# Patient Record
Sex: FEMALE | Race: WHITE | NOT HISPANIC OR LATINO | URBAN - METROPOLITAN AREA
[De-identification: names, ages, dates, MRNs, and addresses within clinical notes are randomized per-mention and may not be internally consistent; named-entity substitution may affect disease eponyms.]

---

## 2023-05-10 ENCOUNTER — OFFICE VISIT (OUTPATIENT)
Dept: PRIMARY CARE | Facility: CLINIC | Age: 68
End: 2023-05-10
Payer: MEDICARE

## 2023-05-10 VITALS — TEMPERATURE: 98 F

## 2023-05-10 DIAGNOSIS — J01.10 ACUTE NON-RECURRENT FRONTAL SINUSITIS: Primary | ICD-10-CM

## 2023-05-10 PROCEDURE — 1160F RVW MEDS BY RX/DR IN RCRD: CPT | Performed by: NURSE PRACTITIONER

## 2023-05-10 PROCEDURE — 1159F MED LIST DOCD IN RCRD: CPT | Performed by: NURSE PRACTITIONER

## 2023-05-10 PROCEDURE — 1036F TOBACCO NON-USER: CPT | Performed by: NURSE PRACTITIONER

## 2023-05-10 PROCEDURE — 99213 OFFICE O/P EST LOW 20 MIN: CPT | Performed by: NURSE PRACTITIONER

## 2023-05-10 RX ORDER — AMOXICILLIN 875 MG/1
875 TABLET, FILM COATED ORAL 2 TIMES DAILY
Qty: 20 TABLET | Refills: 0 | Status: SHIPPED | OUTPATIENT
Start: 2023-05-10 | End: 2023-05-20

## 2023-05-10 RX ORDER — LEVOTHYROXINE SODIUM 100 UG/1
100 TABLET ORAL
COMMUNITY
Start: 2022-11-28

## 2023-05-10 RX ORDER — FAMOTIDINE 10 MG/1
TABLET ORAL
COMMUNITY

## 2023-05-10 RX ORDER — CETIRIZINE HYDROCHLORIDE 10 MG/1
10 TABLET ORAL DAILY
COMMUNITY

## 2023-05-10 RX ORDER — PANTOPRAZOLE SODIUM 40 MG/1
40 TABLET, DELAYED RELEASE ORAL
COMMUNITY
Start: 2022-09-06

## 2023-05-10 RX ORDER — ALPRAZOLAM 0.25 MG/1
0.25 TABLET ORAL NIGHTLY PRN
COMMUNITY
Start: 2022-11-18

## 2023-05-10 RX ORDER — LOSARTAN POTASSIUM 50 MG/1
50 TABLET ORAL 2 TIMES DAILY
COMMUNITY
Start: 2022-10-21

## 2023-05-10 RX ORDER — BUSPIRONE HYDROCHLORIDE 10 MG/1
10 TABLET ORAL 2 TIMES DAILY
COMMUNITY
Start: 2023-04-19

## 2023-05-10 ASSESSMENT — ENCOUNTER SYMPTOMS
SORE THROAT: 1
SLEEP DISTURBANCE: 1
APPETITE CHANGE: 0
VOMITING: 0
RHINORRHEA: 1
DIARRHEA: 0
NAUSEA: 0
CONSTIPATION: 0
HEADACHES: 1
FATIGUE: 0
COUGH: 1
ACTIVITY CHANGE: 0
FEVER: 0
SINUS PRESSURE: 1

## 2023-05-10 NOTE — PROGRESS NOTES
"Subjective   Patient ID: Vilma Riggs is a 67 y.o. female who presents for Sinusitis.    Cold symptoms x1 month  Comes and goes; not going away   Feels like she has a sinus infection  Going to [place] in 2 weeks  Was in [place] with Grandkids    Full ears  Raw throat  Mild cough  Sneezing  Nasal congestion  Post nasal drip  Facial pressure  \"Never gets a fever\"    Home covid tests is negative    OTC- zyrtec/ nyquil/ decongestant      Sinusitis  This is a new problem. The current episode started 1 to 4 weeks ago. The problem has been waxing and waning since onset. There has been no fever. Associated symptoms include congestion, coughing, ear pain, headaches, sinus pressure, sneezing and a sore throat. (Ear pressure, fatigue, post nasal drainage) Past treatments include oral decongestants. The treatment provided mild relief.        Review of Systems   Constitutional:  Negative for activity change, appetite change, fatigue and fever.   HENT:  Positive for congestion, ear pain, postnasal drip, rhinorrhea, sinus pressure, sneezing and sore throat.    Respiratory:  Positive for cough.    Gastrointestinal:  Negative for constipation, diarrhea, nausea and vomiting.   Neurological:  Positive for headaches.   Psychiatric/Behavioral:  Positive for sleep disturbance.        Objective   There were no vitals taken for this visit.    Physical Exam  Vitals reviewed.   Constitutional:       Appearance: Normal appearance.   HENT:      Head: Normocephalic.      Right Ear: Tympanic membrane, ear canal and external ear normal.      Left Ear: Tympanic membrane, ear canal and external ear normal.      Nose: Mucosal edema, congestion and rhinorrhea present. Rhinorrhea is clear.      Mouth/Throat:      Lips: Pink.      Mouth: Mucous membranes are dry.      Pharynx: Posterior oropharyngeal erythema present. No pharyngeal swelling.   Eyes:      Extraocular Movements: Extraocular movements intact.      Pupils: Pupils are equal, round, " and reactive to light.   Cardiovascular:      Rate and Rhythm: Normal rate and regular rhythm.   Pulmonary:      Effort: Pulmonary effort is normal.      Breath sounds: Normal breath sounds.   Musculoskeletal:      Cervical back: Normal range of motion and neck supple.   Skin:     General: Skin is warm and dry.      Capillary Refill: Capillary refill takes less than 2 seconds.   Neurological:      General: No focal deficit present.      Mental Status: She is alert and oriented to person, place, and time.   Psychiatric:         Mood and Affect: Mood normal.         Behavior: Behavior normal.         Assessment/Plan   Problem List Items Addressed This Visit          Infectious/Inflammatory    Acute non-recurrent frontal sinusitis - Primary     Antibiotics given for acute sinusitis; Take full course until completed  Reviewed supportive care for cold symptoms  Can use Flonase and Zyrtec daily for symptom support  F/U with PCP if not improving over the next 3-5 days  ER for SOB, difficulty breathing, uncontrolled fever         Relevant Medications    amoxicillin (Amoxil) 875 mg tablet

## 2023-05-12 PROBLEM — J01.10 ACUTE NON-RECURRENT FRONTAL SINUSITIS: Status: ACTIVE | Noted: 2023-05-12

## 2023-05-12 NOTE — ASSESSMENT & PLAN NOTE
Antibiotics given for acute sinusitis; Take full course until completed  Reviewed supportive care for cold symptoms  Can use Flonase and Zyrtec daily for symptom support  F/U with PCP if not improving over the next 3-5 days  ER for SOB, difficulty breathing, uncontrolled fever

## 2024-12-09 ENCOUNTER — OFFICE VISIT (OUTPATIENT)
Dept: URGENT CARE | Age: 69
End: 2024-12-09
Payer: MEDICARE

## 2024-12-09 VITALS
SYSTOLIC BLOOD PRESSURE: 133 MMHG | OXYGEN SATURATION: 98 % | TEMPERATURE: 97.8 F | HEART RATE: 70 BPM | DIASTOLIC BLOOD PRESSURE: 74 MMHG | RESPIRATION RATE: 16 BRPM

## 2024-12-09 DIAGNOSIS — N30.01 ACUTE CYSTITIS WITH HEMATURIA: Primary | ICD-10-CM

## 2024-12-09 LAB
POC APPEARANCE, URINE: ABNORMAL
POC BILIRUBIN, URINE: NEGATIVE
POC BLOOD, URINE: ABNORMAL
POC COLOR, URINE: ABNORMAL
POC GLUCOSE, URINE: NEGATIVE MG/DL
POC KETONES, URINE: NEGATIVE MG/DL
POC LEUKOCYTES, URINE: ABNORMAL
POC NITRITE,URINE: NEGATIVE
POC PH, URINE: 6 PH
POC PROTEIN, URINE: NEGATIVE MG/DL
POC SPECIFIC GRAVITY, URINE: 1.01
POC UROBILINOGEN, URINE: 0.2 EU/DL

## 2024-12-09 PROCEDURE — 1159F MED LIST DOCD IN RCRD: CPT

## 2024-12-09 PROCEDURE — 87086 URINE CULTURE/COLONY COUNT: CPT

## 2024-12-09 PROCEDURE — 1036F TOBACCO NON-USER: CPT

## 2024-12-09 PROCEDURE — 81003 URINALYSIS AUTO W/O SCOPE: CPT

## 2024-12-09 PROCEDURE — 99204 OFFICE O/P NEW MOD 45 MIN: CPT

## 2024-12-09 PROCEDURE — 1160F RVW MEDS BY RX/DR IN RCRD: CPT

## 2024-12-09 RX ORDER — SULFAMETHOXAZOLE AND TRIMETHOPRIM 800; 160 MG/1; MG/1
1 TABLET ORAL 2 TIMES DAILY
Qty: 10 TABLET | Refills: 0 | Status: SHIPPED | OUTPATIENT
Start: 2024-12-09 | End: 2024-12-14

## 2024-12-09 ASSESSMENT — PATIENT HEALTH QUESTIONNAIRE - PHQ9
2. FEELING DOWN, DEPRESSED OR HOPELESS: NOT AT ALL
1. LITTLE INTEREST OR PLEASURE IN DOING THINGS: NOT AT ALL
SUM OF ALL RESPONSES TO PHQ9 QUESTIONS 1 AND 2: 0

## 2024-12-09 ASSESSMENT — ENCOUNTER SYMPTOMS
CHILLS: 0
FREQUENCY: 1
NAUSEA: 0
DIFFICULTY URINATING: 0
DIAPHORESIS: 0
HEMATURIA: 0
FATIGUE: 0
FLANK PAIN: 0
VOMITING: 0
DYSURIA: 1
ABDOMINAL PAIN: 0
FEVER: 0
BACK PAIN: 1

## 2024-12-09 NOTE — PROGRESS NOTES
Subjective   Patient ID: Vilma Riggs is a 69 y.o. female. They present today with a chief complaint of Back Pain, Chills, and Difficulty Urinating (x1wk).    History of Present Illness  Subjective  Vilma Riggs is a 69 y.o. female who complains of burning with urination, frequency, suprapubic pressure, and urgency for several days. Patient does not have a history of recurrent UTI or pyelonephritis.    @[place]@    [facility]  /74   Pulse 70   Temp 36.6 °C (97.8 °F) (Temporal)   Resp 16   SpO2 98%    General: alert and oriented, in no acute distress  Abdomen: soft, non-tender, without masses or organomegaly  Back: CVA tenderness absent    Laboratory:   Urine dipstick shows +leukocytes, +blood.      Assessment/Plan  Diagnoses and associated orders for this visit:    · Acute cystitis with hematuria   POCT UA Automated manually resulted   sulfamethoxazole-trimethoprim (Bactrim DS) 800-160 mg tablet; Take 1 tablet by mouth 2 times a day for 5 days.   phenazopyridine 99.5 mg tablet; Take 1 tablet (99.5 mg) by mouth 3 times a day for 4 days.            History provided by:  Patient   used: No    Back Pain  Associated symptoms include dysuria. Pertinent negatives include no abdominal pain or fever.   Difficulty Urinating  Associated symptoms: no abdominal pain, no fever, no flank pain, no nausea, no vaginal discharge and no vomiting        Past Medical History  Allergies as of 12/09/2024 - Reviewed 12/09/2024   Allergen Reaction Noted    Oxycodone Unknown and Nausea And Vomiting 01/17/2020    Hydrochlorothiazide Unknown 09/12/2016    Venlafaxine Unknown 10/27/2003       (Not in a hospital admission)       No past medical history on file.    No past surgical history on file.     reports that she has never smoked. She has never used smokeless tobacco. She reports current alcohol use. She reports that she does not use drugs.    Review of Systems  Review of Systems   Constitutional:   Negative for chills, diaphoresis, fatigue and fever.   Gastrointestinal:  Negative for abdominal pain, nausea and vomiting.   Genitourinary:  Positive for dysuria, frequency and urgency. Negative for difficulty urinating, flank pain, hematuria, vaginal bleeding, vaginal discharge and vaginal pain.   Musculoskeletal:  Positive for back pain.                                  Objective    Vitals:    12/09/24 1626   BP: 133/74   Pulse: 70   Resp: 16   Temp: 36.6 °C (97.8 °F)   TempSrc: Temporal   SpO2: 98%     No LMP recorded.    Physical Exam  Vitals and nursing note reviewed.   Constitutional:       General: She is not in acute distress.     Appearance: Normal appearance. She is normal weight. She is not ill-appearing, toxic-appearing or diaphoretic.   HENT:      Mouth/Throat:      Mouth: Mucous membranes are moist.      Pharynx: Oropharynx is clear. No oropharyngeal exudate or posterior oropharyngeal erythema.   Cardiovascular:      Rate and Rhythm: Normal rate and regular rhythm.      Pulses: Normal pulses.      Heart sounds: Normal heart sounds.   Pulmonary:      Effort: Pulmonary effort is normal.      Breath sounds: Normal breath sounds.   Abdominal:      General: Abdomen is flat. Bowel sounds are normal. There is no distension.      Palpations: Abdomen is soft. There is no mass.      Tenderness: There is no abdominal tenderness. There is no right CVA tenderness, left CVA tenderness, guarding or rebound.      Hernia: No hernia is present.   Neurological:      General: No focal deficit present.      Mental Status: She is alert and oriented to person, place, and time.      Sensory: No sensory deficit.         Procedures    Point of Care Test & Imaging Results from this visit  Results for orders placed or performed in visit on 12/09/24   POCT UA Automated manually resulted   Result Value Ref Range    POC Color, Urine Straw Straw, Yellow, Light-Yellow    POC Appearance, Urine Cloudy (A) Clear    POC Glucose, Urine  NEGATIVE NEGATIVE mg/dl    POC Bilirubin, Urine NEGATIVE NEGATIVE    POC Ketones, Urine NEGATIVE NEGATIVE mg/dl    POC Specific Gravity, Urine 1.015 1.005 - 1.035    POC Blood, Urine SMALL (1+) (A) NEGATIVE    POC PH, Urine 6.0 No Reference Range Established PH    POC Protein, Urine NEGATIVE NEGATIVE, 30 (1+) mg/dl    POC Urobilinogen, Urine 0.2 0.2, 1.0 EU/DL    Poc Nitrite, Urine NEGATIVE NEGATIVE    POC Leukocytes, Urine TRACE (A) NEGATIVE      No results found.    Diagnostic study results (if any) were reviewed by MIO [place].    Assessment/Plan   Allergies, medications, history, and pertinent labs/EKGs/Imaging reviewed by MIO [place].     Medical Decision Making    Urgent Care Course:  Patient presents to the  with dysuria, urinary frequency.  Patient is afebrile, hemodynamically stable.  Pt denies fever, n/v, cp, sob, hematuria, rash, and diarrhea.  Abdomen is soft, non tender, non acute.  No rebound tenderness, guarding, distention, or CVA tenderness.   Patient believes that they have a UTI.  UA shows leukocytes and blood.  Will send urine for culture.   Patient educated about UTIs.  Patient given prescription for Bactrim, given UTI warning signs and will f/u with pcp.   Prior external records reviewed: Outpatient records  Reviewed pertinent findings from resulted labs:  UA - UTI  Independent Hx provided by:  Patient  Pt's case/impression summarized and discussed with:  Patient  Likely Dx given clinical picture:  UTI with hematuria  Although not an exhaustive list of Differential Diagnosis (though considered), patient's HPI, PE, and other findings are not suggestive of:  pyelonephritis, kidney stone, urethral stricture, bladder spasms, bladder cancer   Patient at time of discharge was clinically well-appearing and HDS for outpatient management. The patient and/or family was given the opportunity to ask questions prior to discharge, understood my verbal discussion of the plans for  treatment, expected course, indications to return to  or seek further treatment in ED, and the need for timely follow up as directed.    Condition: Stable  Disposition:  Discharge    Orders and Diagnoses  Diagnoses and all orders for this visit:  Acute cystitis with hematuria  -     POCT UA Automated manually resulted  -     sulfamethoxazole-trimethoprim (Bactrim DS) 800-160 mg tablet; Take 1 tablet by mouth 2 times a day for 5 days.  -     phenazopyridine 99.5 mg tablet; Take 1 tablet (99.5 mg) by mouth 3 times a day for 4 days.      Medical Admin Record      Patient disposition: Home    Electronically signed by MIO [place]  4:53 PM

## 2024-12-11 LAB — BACTERIA UR CULT: NORMAL
